# Patient Record
(demographics unavailable — no encounter records)

---

## 2025-04-21 NOTE — ADDENDUM
[FreeTextEntry1] : Pt awaiting colonoscopy with Dr. Marcus.\par  She is s/p mLAD PCI 10/2017; subsequent cath 11/2018 with patent stents and no new flow-limiting lesions.  LVEF 60% on echo with no significant valvular heart disease.\par  She is cleared from  a cardiac standpoint to proceed with colonoscopy without the need for any further cardiac testing at this time. \par  She can hold her Brilinta 3 days prior to her procedure, and restart as soon as possible post-op (preferably the same day if ok with GI).

## 2025-04-21 NOTE — DISCUSSION/SUMMARY
[FreeTextEntry1] : 1)Cardiac: s/p mLAD PCI 10/18/17.  Repeat cath 11/2018 with patent stent and no new CAD s/p repeat cath 12/2020: patent stent w/ no new CAD stress test 2024 normal -allergic to asa -cont statin 2)HL: tolerating statin well -FLP at goal 3)BP at goal 4)Palpitations:  -ZIo unremarkable 5)TIA: no deficits.  TTE unremarkable -JANICE to r/o ASD/PFO negative -remains on Plavix [EKG obtained to assist in diagnosis and management of assessed problem(s)] : EKG obtained to assist in diagnosis and management of assessed problem(s)

## 2025-04-21 NOTE — HISTORY OF PRESENT ILLNESS
[FreeTextEntry1] : Agatha 65yo lady with a PMH of untreated hyperlipidemia (Tchol 250s); here for evaluation of chest pain.  Chest pain began Saturday night and extended into Sunday; substernal with radiation down left arm and associated with palpitations.  Went to the ER where EKG and Franki were all negative and she was d/c'd home.  Here today for follow-up.  Started on lipitor by Dr. Ng yesterday... has not started med yet.  No further episodes of chest pain but still has some mild residual discomfort.  Follows a fair diet but likes sweets.  Active at home.    3/2017: CTA with >70% mLAD lesion  4/2017: she is s/p cardiac cath.  Cath with a 60% mLAD lesion that was NOT significant after FFR.  No further episodes of chest pain.  7/14/17:  feeling great.  Asymptomatic.  Tchol at last check at PMDs 150.  Lost 15lbs with diet and exercise.  10/13/17: over the last 2 months, she has been having progressive dyspnea with exertion.  Is having difficulty going up stairs without having to stop and catch her breath.  Associated with some back pain. s/p cath with PCI to mLAD  11/16/18; s/p cath for + stress test... patent stent with no new CAD  12/6/19: over the last 6 month has started to have runs of palpitations/tachycardia that start spontaneously; last a few hours and resolve.  Occur ~1-2x/month; last episode a few days ago. Has difficulty doing anything during these episodes. Denied dyspnea/syncope; but does have some chest pain during the episodes of tachycardia. Feels regular to her.  No hx of afib.  12/18/20: episode of chest pain last week... admitted to the hospital s/p repeat cath: patent stent w/ no new CAD  9/24/21: since our last visit, has had a TIA. No residual deficits.  2/17/23: feeling well overall has noted some episodes of palpitations (<1min) ... about 2-3x in the last few weeks  1/23/24: feeling well overall murmur on exam  4/21/25: feeling great dx w/ DM but trying to treat w/ diet and exercise spent 6 months in [facility] w/o issue seen by vasc - no sclerotherapy yet; waiting for now stress test 2024 normal EKG today unremarkable

## 2025-04-21 NOTE — PHYSICAL EXAM
[General Appearance - Well Developed] : well developed [Normal Appearance] : normal appearance [Well Groomed] : well groomed [General Appearance - Well Nourished] : well nourished [No Deformities] : no deformities [General Appearance - In No Acute Distress] : no acute distress [Normal Conjunctiva] : the conjunctiva exhibited no abnormalities [Eyelids - No Xanthelasma] : the eyelids demonstrated no xanthelasmas [Normal Oral Mucosa] : normal oral mucosa [No Oral Pallor] : no oral pallor [No Oral Cyanosis] : no oral cyanosis [Normal Jugular Venous A Waves Present] : normal jugular venous A waves present [Normal Jugular Venous V Waves Present] : normal jugular venous V waves present [No Jugular Venous Ding A Waves] : no jugular venous ding A waves [Abdomen Soft] : soft [Abdomen Tenderness] : non-tender [Abdomen Mass (___ Cm)] : no abdominal mass palpated [Abnormal Walk] : normal gait [Gait - Sufficient For Exercise Testing] : the gait was sufficient for exercise testing [Nail Clubbing] : no clubbing of the fingernails [Cyanosis, Localized] : no localized cyanosis [Petechial Hemorrhages (___cm)] : no petechial hemorrhages [Skin Color & Pigmentation] : normal skin color and pigmentation [] : no rash [No Venous Stasis] : no venous stasis [Skin Lesions] : no skin lesions [No Skin Ulcers] : no skin ulcer [No Xanthoma] : no  xanthoma was observed [Oriented To Time, Place, And Person] : oriented to person, place, and time [Affect] : the affect was normal [Mood] : the mood was normal [No Anxiety] : not feeling anxious [5th Left ICS - MCL] : palpated at the 5th LICS in the midclavicular line [Normal Rate] : normal [Rhythm Regular] : regular [Normal S1] : normal S1 [Normal S2] : normal S2 [II] : a grade 2 [2+] : left 2+ [No Abnormalities] : the abdominal aorta was not enlarged and no bruit was heard [No Pitting Edema] : no pitting edema present [Normal Rhythm/Effort] : normal respiratory rhythm and effort [Clear Bilaterally] : the lungs were clear to auscultation bilaterally [Normal to Percussion] : the lungs were normal to percussion [Normal] : palpation of the chest was normal [S3] : no S3 [S4] : no S4 [Right Carotid Bruit] : no bruit heard over the right carotid [Left Carotid Bruit] : no bruit heard over the left carotid [Right Femoral Bruit] : no bruit heard over the right femoral artery [Left Femoral Bruit] : no bruit heard over the left femoral artery